# Patient Record
Sex: MALE | Race: WHITE | Employment: OTHER
[De-identification: names, ages, dates, MRNs, and addresses within clinical notes are randomized per-mention and may not be internally consistent; named-entity substitution may affect disease eponyms.]

---

## 2022-06-20 RX ORDER — FLUTICASONE PROPIONATE 50 MCG
SPRAY, SUSPENSION (ML) NASAL
COMMUNITY

## 2022-08-22 PROBLEM — E88.09 HYPOALBUMINEMIA: Status: ACTIVE | Noted: 2022-08-22

## 2022-08-22 PROBLEM — J40 BRONCHITIS: Status: ACTIVE | Noted: 2022-08-22

## 2022-08-22 PROBLEM — C44.91 BASAL CELL CARCINOMA: Status: ACTIVE | Noted: 2022-08-22

## 2022-08-22 PROBLEM — C61 PROSTATE CANCER (HCC): Status: ACTIVE | Noted: 2022-08-22

## 2022-08-22 PROBLEM — R53.1 WEAKNESS: Status: ACTIVE | Noted: 2022-08-22

## 2022-08-22 PROBLEM — R32 URINARY INCONTINENCE: Status: ACTIVE | Noted: 2022-08-22

## 2022-08-22 PROBLEM — E87.1 HYPONATREMIA: Status: ACTIVE | Noted: 2022-08-22

## 2022-08-22 PROBLEM — K44.9 HIATAL HERNIA: Status: ACTIVE | Noted: 2022-08-22

## 2022-08-22 PROBLEM — C50.919 MALIGNANT NEOPLASM OF BREAST (HCC): Status: ACTIVE | Noted: 2022-08-22

## 2022-08-22 PROBLEM — M54.50 LOW BACK PAIN: Status: ACTIVE | Noted: 2022-08-22

## 2022-08-22 PROBLEM — N40.0 BENIGN PROSTATIC HYPERPLASIA WITHOUT URINARY OBSTRUCTION: Status: ACTIVE | Noted: 2022-08-22

## 2022-08-22 PROBLEM — E78.1 HYPERTRIGLYCERIDEMIA: Status: ACTIVE | Noted: 2022-08-22

## 2022-08-22 PROBLEM — R07.9 CHEST PAIN: Status: ACTIVE | Noted: 2022-08-22

## 2022-08-22 PROBLEM — K57.92 DIVERTICULITIS: Status: ACTIVE | Noted: 2022-08-22

## 2022-08-22 PROBLEM — N63.0 BREAST MASS: Status: ACTIVE | Noted: 2022-08-22

## 2022-08-22 PROBLEM — M72.0 DUPUYTREN'S CONTRACTURE: Status: ACTIVE | Noted: 2022-08-22

## 2022-08-22 PROBLEM — J06.9 URI (UPPER RESPIRATORY INFECTION): Status: ACTIVE | Noted: 2022-08-22

## 2022-08-22 PROBLEM — M17.9 OSTEOARTHRITIS OF KNEE: Status: ACTIVE | Noted: 2022-08-22

## 2022-08-22 PROBLEM — R05.9 COUGH: Status: ACTIVE | Noted: 2022-08-22

## 2022-08-22 PROBLEM — E66.9 OBESITY: Status: ACTIVE | Noted: 2022-08-22

## 2022-08-22 PROBLEM — D64.9 ANEMIA: Status: ACTIVE | Noted: 2022-08-22

## 2022-08-22 PROBLEM — K40.90 LEFT INGUINAL HERNIA: Status: ACTIVE | Noted: 2022-08-22

## 2022-08-22 PROBLEM — K21.9 GASTROESOPHAGEAL REFLUX DISEASE: Status: ACTIVE | Noted: 2022-08-22

## 2022-08-22 PROBLEM — J01.90 ACUTE SINUSITIS: Status: ACTIVE | Noted: 2022-08-22

## 2022-08-22 PROBLEM — M25.562 LEFT KNEE PAIN: Status: ACTIVE | Noted: 2022-08-22

## 2022-08-22 PROBLEM — K62.5 RECTAL BLEEDING: Status: ACTIVE | Noted: 2022-08-22

## 2022-08-22 PROBLEM — C44.91 MALIGNANT BASAL CELL NEOPLASM OF SKIN: Status: ACTIVE | Noted: 2022-08-22

## 2022-08-22 PROBLEM — D17.1 LIPOMA OF BACK: Status: ACTIVE | Noted: 2022-08-22

## 2022-08-22 PROBLEM — I83.90 VARICOSE VEINS: Status: ACTIVE | Noted: 2022-08-22

## 2022-08-22 PROBLEM — E78.00 PURE HYPERCHOLESTEROLEMIA: Status: ACTIVE | Noted: 2022-08-22

## 2022-08-25 PROBLEM — M17.11 PRIMARY LOCALIZED OSTEOARTHRITIS OF RIGHT KNEE: Status: ACTIVE | Noted: 2022-08-22

## 2022-09-21 PROBLEM — J06.9 URI (UPPER RESPIRATORY INFECTION): Status: RESOLVED | Noted: 2022-08-22 | Resolved: 2022-09-21

## 2022-09-21 PROBLEM — R05.9 COUGH: Status: RESOLVED | Noted: 2022-08-22 | Resolved: 2022-09-21

## 2023-02-27 PROBLEM — I87.2 VENOUS STASIS DERMATITIS OF BOTH LOWER EXTREMITIES: Status: ACTIVE | Noted: 2023-02-27

## 2023-03-14 PROBLEM — K57.90 DIVERTICULAR DISEASE: Status: ACTIVE | Noted: 2023-03-14

## 2023-09-14 PROBLEM — S42.352A CLOSED DISPLACED COMMINUTED FRACTURE OF SHAFT OF LEFT HUMERUS, INITIAL ENCOUNTER: Status: ACTIVE | Noted: 2023-09-14

## 2023-09-18 PROBLEM — N39.0 ENTEROCOCCUS UTI: Status: ACTIVE | Noted: 2023-09-18

## 2023-09-18 PROBLEM — A41.9 SEPSIS WITHOUT ACUTE ORGAN DYSFUNCTION (HCC): Status: ACTIVE | Noted: 2023-09-18

## 2023-09-18 PROBLEM — A49.8 INFECTION DUE TO ENTEROBACTER CLOACAE: Status: ACTIVE | Noted: 2023-09-18

## 2023-09-18 PROBLEM — B95.2 ENTEROCOCCUS UTI: Status: ACTIVE | Noted: 2023-09-18

## 2023-09-25 PROBLEM — S42.309A HUMERUS FRACTURE: Status: ACTIVE | Noted: 2023-09-25

## 2023-09-25 PROBLEM — Z87.81 HISTORY OF HUMERUS FRACTURE: Status: ACTIVE | Noted: 2023-09-25

## 2023-09-26 PROBLEM — D62 ACUTE BLOOD LOSS ANEMIA: Status: ACTIVE | Noted: 2023-09-26

## 2023-09-26 PROBLEM — D59.12 COLD AGGLUTININ DISEASE (HCC): Status: ACTIVE | Noted: 2023-09-26

## 2023-09-26 PROBLEM — N30.00 ACUTE CYSTITIS WITHOUT HEMATURIA: Status: ACTIVE | Noted: 2023-09-26

## 2023-10-10 PROBLEM — S42.352A CLOSED DISPLACED COMMINUTED FRACTURE OF SHAFT OF LEFT HUMERUS, INITIAL ENCOUNTER: Status: RESOLVED | Noted: 2023-09-14 | Resolved: 2023-10-10

## 2023-10-10 PROBLEM — N30.00 ACUTE CYSTITIS WITHOUT HEMATURIA: Status: RESOLVED | Noted: 2023-09-26 | Resolved: 2023-10-10

## 2023-10-10 PROBLEM — A41.9 SEPSIS WITHOUT ACUTE ORGAN DYSFUNCTION (HCC): Status: RESOLVED | Noted: 2023-09-18 | Resolved: 2023-10-10

## 2023-10-10 PROBLEM — R53.1 WEAKNESS: Status: RESOLVED | Noted: 2022-08-22 | Resolved: 2023-10-10

## 2023-10-10 PROBLEM — A49.8 INFECTION DUE TO ENTEROBACTER CLOACAE: Status: RESOLVED | Noted: 2023-09-18 | Resolved: 2023-10-10

## 2023-10-10 PROBLEM — R07.9 CHEST PAIN: Status: RESOLVED | Noted: 2022-08-22 | Resolved: 2023-10-10

## 2023-10-10 PROBLEM — Z87.81 HISTORY OF HUMERUS FRACTURE: Status: RESOLVED | Noted: 2023-09-25 | Resolved: 2023-10-10

## 2023-12-21 PROBLEM — R94.31 ABNORMAL ECG: Status: ACTIVE | Noted: 2020-05-28

## 2023-12-21 PROBLEM — R06.02 SHORTNESS OF BREATH: Status: ACTIVE | Noted: 2020-05-28

## 2023-12-21 PROBLEM — I49.9 CARDIAC ARRHYTHMIA, UNSPECIFIED: Status: ACTIVE | Noted: 2020-05-28

## 2024-04-24 PROBLEM — C61 PROSTATE CANCER (HCC): Status: RESOLVED | Noted: 2022-08-22 | Resolved: 2024-04-24

## 2024-04-24 PROBLEM — C44.91 BASAL CELL CARCINOMA: Status: RESOLVED | Noted: 2022-08-22 | Resolved: 2024-04-24

## 2024-04-24 PROBLEM — Z85.46 HISTORY OF PROSTATE CANCER: Status: ACTIVE | Noted: 2024-04-24

## 2024-04-24 PROBLEM — C44.91 MALIGNANT BASAL CELL NEOPLASM OF SKIN: Status: RESOLVED | Noted: 2022-08-22 | Resolved: 2024-04-24

## 2024-04-24 PROBLEM — Z85.828 HX OF SKIN CANCER, BASAL CELL: Status: ACTIVE | Noted: 2024-04-24

## 2024-06-19 PROBLEM — K92.2 LOWER GI BLEED: Status: ACTIVE | Noted: 2024-06-19

## 2024-06-19 PROBLEM — K92.2 LOWER GI BLEED: Status: RESOLVED | Noted: 2024-06-19 | Resolved: 2024-06-19

## 2024-06-19 PROBLEM — K57.92 DIVERTICULITIS: Status: RESOLVED | Noted: 2022-08-22 | Resolved: 2024-06-19

## 2024-06-20 ENCOUNTER — CARE COORDINATION (OUTPATIENT)
Facility: CLINIC | Age: 89
End: 2024-06-20

## 2024-06-20 NOTE — CARE COORDINATION
Care Transitions Note    Initial Call - Call within 2 business days of discharge: Yes    Patient Current Location:  [place]    Care Transition Nurse contacted the patient by telephone to perform post hospital discharge assessment, verified name and  as identifiers. Provided introduction to self, and explanation of the Care Transition Nurse role.     Patient: Basil Esposito Jr.    Patient : 1931   MRN: <[id number]>        Reason for Admission: per chart review,     Principal Problem (Resolved):    Diverticulitis  Active Problems:    Cold agglutinin disease (HCC)  Resolved Problems:    Lower GI bleed      Discharge Date: 24  RURS: Readmission Risk Score: 17.7      Last Discharge Facility       Date Complaint Diagnosis Description Type Department Provider    6/10/24 Rectal Bleeding Lower GI bleed ... ED to Hosp-Admission (Discharged) (ADMITTED) [id number] Leon Mckeon, ; Gris Akers...            Was this an external facility discharge? No    Additional needs identified to be addressed with provider   No needs identified             Method of communication with provider: none.    Patients top risk factors for readmission:   Medical Condition       Care Summary:     Patient reports:   - He lives by himself   - He is slowly getting there   - He has accomplished most of what he needs to do this morning   - His support system is his son who lives not too far away   - He ( patient) is not going to drive until the swelling in his feet improves.     Care Transition Nurse reviewed discharge instructions, medical action plan, and red flags with patient. The patient was given an opportunity to ask questions;  No questions or concerns voiced at this time  .   Were discharge instructions available to patient? Yes.   Reviewed appropriate site of care based on symptoms and resources available to patient including: PCP  Specialist  After hours contact number-Medical provider office phone number.   When to call